# Patient Record
Sex: MALE | Race: WHITE
[De-identification: names, ages, dates, MRNs, and addresses within clinical notes are randomized per-mention and may not be internally consistent; named-entity substitution may affect disease eponyms.]

---

## 2019-12-11 ENCOUNTER — RECORD ABSTRACTING (OUTPATIENT)
Age: 59
End: 2019-12-11

## 2019-12-11 DIAGNOSIS — E78.00 PURE HYPERCHOLESTEROLEMIA, UNSPECIFIED: ICD-10-CM

## 2019-12-11 PROBLEM — Z00.00 ENCOUNTER FOR PREVENTIVE HEALTH EXAMINATION: Status: ACTIVE | Noted: 2019-12-11

## 2019-12-11 LAB
PSA FREE FLD-MCNC: 31.5
PSA FREE SERPL-MCNC: 0.4
PSA SERPL-MCNC: 1.27
TESTOST BND SERPL-MCNC: 154.8

## 2020-03-05 ENCOUNTER — APPOINTMENT (OUTPATIENT)
Dept: UROLOGY | Facility: CLINIC | Age: 60
End: 2020-03-05

## 2020-04-23 ENCOUNTER — APPOINTMENT (OUTPATIENT)
Dept: UROLOGY | Facility: CLINIC | Age: 60
End: 2020-04-23
Payer: COMMERCIAL

## 2020-04-23 DIAGNOSIS — F17.290 NICOTINE DEPENDENCE, OTHER TOBACCO PRODUCT, UNCOMPLICATED: ICD-10-CM

## 2020-04-23 DIAGNOSIS — Z82.49 FAMILY HISTORY OF ISCHEMIC HEART DISEASE AND OTHER DISEASES OF THE CIRCULATORY SYSTEM: ICD-10-CM

## 2020-04-23 DIAGNOSIS — Z78.9 OTHER SPECIFIED HEALTH STATUS: ICD-10-CM

## 2020-04-23 PROCEDURE — 99203 OFFICE O/P NEW LOW 30 MIN: CPT | Mod: 95

## 2020-04-23 RX ORDER — ROSUVASTATIN CALCIUM 20 MG/1
20 TABLET, FILM COATED ORAL
Refills: 0 | Status: ACTIVE | COMMUNITY

## 2020-04-23 NOTE — ASSESSMENT
[FreeTextEntry1] : I discussed the findings and options with Mr. OWEN MIRANDA in detail.  No further intervention is warranted at this time.  As a matter fact since he is now 21 years out from the testicular cancer, this no longer needs to be followed.\par \par Once the COVID 19 pandemic has resolved, Mr. Miranda will come in for an examination including a PSA and bladder sonogram.\par

## 2020-04-23 NOTE — HISTORY OF PRESENT ILLNESS
[FreeTextEntry1] : I contacted Mr. OWEN MIRANDA by telemedicine using the [employer] platform.  The appropriate consent was obtained prior to the conference.  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  He has been [facility] since.  \par \par From his general urologic history he reports very minimal LUTS with nocturia x 0-1.  \par His erectile function is normal. \par \par Tumor markers:  3/5/19--NL\par Testosterone: 3/5/19--183.8L; 3/7/18--154.8L\par PSA:  3/5/19--1.27; 3/7/18--1.68; 2/23/17--0.91\par \par CT abd/pelvis:  2/8/08-- Stable 1.2cm radiolucent lesion in the left femoral head. No lymphadenopathy. Filling defect in the descending duodenum.\par Upper GI: 2/29/08--Normal study.

## 2020-04-23 NOTE — PHYSICAL EXAM
[General Appearance - Well Developed] : well developed [General Appearance - Well Nourished] : well nourished [Normal Appearance] : normal appearance [Well Groomed] : well groomed [General Appearance - In No Acute Distress] : no acute distress [Affect] : the affect was normal [Oriented To Time, Place, And Person] : oriented to person, place, and time [Mood] : the mood was normal [Not Anxious] : not anxious

## 2023-04-02 PROBLEM — R35.1 NOCTURIA: Status: ACTIVE | Noted: 2020-04-23

## 2023-04-02 PROBLEM — E29.1 HYPOGONADISM IN MALE: Status: ACTIVE | Noted: 2020-04-23

## 2023-04-02 PROBLEM — R39.9 LOWER URINARY TRACT SYMPTOMS: Status: ACTIVE | Noted: 2020-04-23

## 2023-04-03 ENCOUNTER — APPOINTMENT (OUTPATIENT)
Dept: UROLOGY | Facility: CLINIC | Age: 63
End: 2023-04-03
Payer: COMMERCIAL

## 2023-04-03 VITALS
WEIGHT: 170 LBS | HEIGHT: 70 IN | BODY MASS INDEX: 24.34 KG/M2 | OXYGEN SATURATION: 100 % | DIASTOLIC BLOOD PRESSURE: 78 MMHG | HEART RATE: 54 BPM | SYSTOLIC BLOOD PRESSURE: 132 MMHG

## 2023-04-03 DIAGNOSIS — K40.90 UNILATERAL INGUINAL HERNIA, W/OUT OBSTRUCTION OR GANGRENE, NOT SPECIFIED AS RECURRENT: ICD-10-CM

## 2023-04-03 DIAGNOSIS — Z12.5 ENCOUNTER FOR SCREENING FOR MALIGNANT NEOPLASM OF PROSTATE: ICD-10-CM

## 2023-04-03 DIAGNOSIS — R35.1 NOCTURIA: ICD-10-CM

## 2023-04-03 DIAGNOSIS — D49.59 NEOPLASM UNSPECIFIED BEHAVIOR OF OTHER GENITOURINARY ORGAN: ICD-10-CM

## 2023-04-03 DIAGNOSIS — R39.9 UNSPECIFIED SYMPTOMS AND SIGNS INVOLVING THE GENITOURINARY SYSTEM: ICD-10-CM

## 2023-04-03 DIAGNOSIS — E29.1 TESTICULAR HYPOFUNCTION: ICD-10-CM

## 2023-04-03 LAB
BILIRUB UR QL STRIP: NORMAL
CLARITY UR: CLEAR
GLUCOSE UR-MCNC: NORMAL
HCG UR QL: 0.2 EU/DL
HGB UR QL STRIP.AUTO: NORMAL
KETONES UR-MCNC: NORMAL
LEUKOCYTE ESTERASE UR QL STRIP: NORMAL
NITRITE UR QL STRIP: NORMAL
PH UR STRIP: 5.5
PROT UR STRIP-MCNC: NORMAL
SP GR UR STRIP: >1.03

## 2023-04-03 PROCEDURE — 99214 OFFICE O/P EST MOD 30 MIN: CPT | Mod: 25

## 2023-04-03 PROCEDURE — 81003 URINALYSIS AUTO W/O SCOPE: CPT | Mod: QW

## 2023-04-03 PROCEDURE — 51798 US URINE CAPACITY MEASURE: CPT

## 2023-04-03 RX ORDER — EZETIMIBE 10 MG/1
10 TABLET ORAL
Qty: 30 | Refills: 0 | Status: ACTIVE | COMMUNITY
Start: 2023-03-08

## 2023-04-03 NOTE — HISTORY OF PRESENT ILLNESS
[FreeTextEntry1] : Mr. OWEN MIRANDA comes in today for his urologic follow-up after having last been seen 3 years ago. He had a recent colonoscopy with Dr. Peter Regan, who identified an enlarged prostate. \par \par Mr. 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  He has been [facility] since.  \par \par From his general urologic history he reports negligible lower urinary tract symptoms without nocturia. \par IPSS: 0/35\par Sono (performed to assess bladder emptying): PVR 6 cc\par \par His erectile function is normal. \par \par PSA:  3/5/19--1.27; 3/7/18--1.68; 2/23/17--0.91; \par \par Tumor markers:  3/5/19--NL\par Testosterone: 3/5/19--183.8L; 3/7/18--154.8L\par \par CT abd/pelvis:  2/8/08-- Stable 1.2cm radiolucent lesion in the left femoral head. No lymphadenopathy. 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 Rete testis, epididymis, and spermatic cord are free of tumor; \par \par Upper GI: 2/29/08--Normal study.

## 2023-04-03 NOTE — ASSESSMENT
[FreeTextEntry1] : I discussed the findings and options with Mr. OWEN MIRANDA in detail.  No further intervention is warranted at this time, including for the testicular cancer as he is ~23 years NAVI.  \par \par Mr. Miranda will simply continue with annual PSAs and DREs.  We will call him with the current value.\par \par \par

## 2023-04-03 NOTE — LETTER BODY
[Dear  ___] : Dear  [unfilled], [Consult Letter:] : I had the pleasure of evaluating your patient, [unfilled]. [Please see my note below.] : Please see my note below. [Consult Closing:] : Thank you very much for allowing me to participate in the care of this patient.  If you have any questions, please do not hesitate to contact me. [Sincerely,] : Sincerely, [DrEva  ___] : Dr. VEE [FreeTextEntry3] : Alexei Alaniz MD, FACS\par

## 2023-04-03 NOTE — ADDENDUM
[FreeTextEntry1] : A portion of this note was written by [Venkata Lazo] on 03/31/2023 acting as a scribe for Dr. Alaniz. \par \par I have personally reviewed the chart and agree that the record accurately reflects my personal performance of the history, physical exam, assessment, and plan.

## 2023-04-03 NOTE — PHYSICAL EXAM
[General Appearance - Well Developed] : well developed [General Appearance - Well Nourished] : well nourished [Normal Appearance] : normal appearance [Well Groomed] : well groomed [General Appearance - In No Acute Distress] : no acute distress [Oriented To Time, Place, And Person] : oriented to person, place, and time [Affect] : the affect was normal [Mood] : the mood was normal [Not Anxious] : not anxious [Abdomen Soft] : soft [Abdomen Tenderness] : non-tender [Costovertebral Angle Tenderness] : no ~M costovertebral angle tenderness [Urethral Meatus] : meatus normal [Urinary Bladder Findings] : the bladder was normal on palpation [Scrotum] : the scrotum was normal [Testes Mass (___cm)] : there were no testicular masses [No Prostate Nodules] : no prostate nodules [Edema] : no peripheral edema [] : no respiratory distress [Respiration, Rhythm And Depth] : normal respiratory rhythm and effort [Exaggerated Use Of Accessory Muscles For Inspiration] : no accessory muscle use [Normal Station and Gait] : the gait and station were normal for the patient's age [No Focal Deficits] : no focal deficits [Abdomen Mass (___ Cm)] : no abdominal mass palpated [Penis Abnormality] : normal circumcised penis [Epididymis] : the epididymides were normal [Testes Tenderness] : no tenderness of the testes [Prostate Tenderness] : the prostate was not tender [Skin Color & Pigmentation] : normal skin color and pigmentation [Inguinal Lymph Nodes Enlarged Bilaterally] : inguinal [FreeTextEntry1] : Absent right testis

## 2023-04-04 ENCOUNTER — NON-APPOINTMENT (OUTPATIENT)
Age: 63
End: 2023-04-04

## 2023-04-04 LAB — PSA SERPL-MCNC: 2.31 NG/ML

## 2023-04-05 ENCOUNTER — NON-APPOINTMENT (OUTPATIENT)
Age: 63
End: 2023-04-05

## 2024-05-23 ENCOUNTER — APPOINTMENT (OUTPATIENT)
Dept: ULTRASOUND IMAGING | Facility: CLINIC | Age: 64
End: 2024-05-23
Payer: COMMERCIAL

## 2024-05-23 PROCEDURE — 76536 US EXAM OF HEAD AND NECK: CPT

## 2024-11-14 ENCOUNTER — APPOINTMENT (OUTPATIENT)
Dept: ULTRASOUND IMAGING | Facility: CLINIC | Age: 64
End: 2024-11-14
Payer: COMMERCIAL

## 2024-11-14 PROCEDURE — 76536 US EXAM OF HEAD AND NECK: CPT
